# Patient Record
Sex: FEMALE | Race: WHITE | NOT HISPANIC OR LATINO
[De-identification: names, ages, dates, MRNs, and addresses within clinical notes are randomized per-mention and may not be internally consistent; named-entity substitution may affect disease eponyms.]

---

## 2021-06-09 PROBLEM — Z00.00 ENCOUNTER FOR PREVENTIVE HEALTH EXAMINATION: Status: ACTIVE | Noted: 2021-06-09

## 2021-06-10 ENCOUNTER — APPOINTMENT (OUTPATIENT)
Dept: OTOLARYNGOLOGY | Facility: CLINIC | Age: 71
End: 2021-06-10
Payer: MEDICARE

## 2021-06-10 VITALS
TEMPERATURE: 97.5 F | WEIGHT: 152 LBS | HEIGHT: 64 IN | HEART RATE: 51 BPM | SYSTOLIC BLOOD PRESSURE: 119 MMHG | DIASTOLIC BLOOD PRESSURE: 71 MMHG | BODY MASS INDEX: 25.95 KG/M2

## 2021-06-10 DIAGNOSIS — H92.01 OTALGIA, RIGHT EAR: ICD-10-CM

## 2021-06-10 DIAGNOSIS — Z78.9 OTHER SPECIFIED HEALTH STATUS: ICD-10-CM

## 2021-06-10 DIAGNOSIS — H91.93 UNSPECIFIED HEARING LOSS, BILATERAL: ICD-10-CM

## 2021-06-10 PROCEDURE — 92550 TYMPANOMETRY & REFLEX THRESH: CPT

## 2021-06-10 PROCEDURE — 92557 COMPREHENSIVE HEARING TEST: CPT

## 2021-06-10 PROCEDURE — 99204 OFFICE O/P NEW MOD 45 MIN: CPT

## 2021-06-10 RX ORDER — PANCRELIPASE 36000; 180000; 114000 [USP'U]/1; [USP'U]/1; [USP'U]/1
36000-114000 CAPSULE, DELAYED RELEASE PELLETS ORAL
Refills: 0 | Status: ACTIVE | COMMUNITY

## 2021-06-10 RX ORDER — LISINOPRIL AND HYDROCHLOROTHIAZIDE TABLETS 10; 12.5 MG/1; MG/1
10-12.5 TABLET ORAL
Refills: 0 | Status: ACTIVE | COMMUNITY

## 2021-06-10 RX ORDER — CHROMIUM 200 MCG
TABLET ORAL
Refills: 0 | Status: ACTIVE | COMMUNITY

## 2021-06-10 RX ORDER — FLUTICASONE PROPIONATE 50 UG/1
50 SPRAY, METERED NASAL
Refills: 0 | Status: ACTIVE | COMMUNITY

## 2021-06-10 RX ORDER — IBUPROFEN 800 MG/1
TABLET, FILM COATED ORAL
Refills: 0 | Status: ACTIVE | COMMUNITY

## 2021-06-10 NOTE — CONSULT LETTER
[Dear  ___] : Dear  [unfilled], [Courtesy Letter:] : I had the pleasure of seeing your patient, [unfilled], in my office today. [Consult Closing:] : Thank you very much for allowing me to participate in the care of this patient.  If you have any questions, please do not hesitate to contact me. [Sincerely,] : Sincerely, [FreeTextEntry3] : Derrick River MD\par Department of Otolaryngology - Head and Neck Surgery\par [facility]
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
## 2021-06-10 NOTE — PHYSICAL EXAM
[FreeTextEntry1] : Au: EAC clear, no mass or lesion. TM intact and mobile, ME clear [Midline] : trachea located in midline position [Normal] : no rashes

## 2023-03-28 ENCOUNTER — APPOINTMENT (OUTPATIENT)
Dept: UROLOGY | Facility: CLINIC | Age: 73
End: 2023-03-28
Payer: MEDICARE

## 2023-03-28 VITALS
BODY MASS INDEX: 29.06 KG/M2 | RESPIRATION RATE: 16 BRPM | HEART RATE: 59 BPM | WEIGHT: 148 LBS | OXYGEN SATURATION: 97 % | SYSTOLIC BLOOD PRESSURE: 131 MMHG | HEIGHT: 59.84 IN | TEMPERATURE: 97.7 F | DIASTOLIC BLOOD PRESSURE: 74 MMHG

## 2023-03-28 DIAGNOSIS — R31.29 OTHER MICROSCOPIC HEMATURIA: ICD-10-CM

## 2023-03-28 PROCEDURE — 99203 OFFICE O/P NEW LOW 30 MIN: CPT

## 2023-03-28 NOTE — HISTORY OF PRESENT ILLNESS
[FreeTextEntry1] : Patient Name: Alejandra Dickerson\par Date of Birth: [birth year]\par Contact Number: [phone] \par ------------------------------------------------------------------------------\par Date of Initial Visit: 3/28/23\par Referring Provider/PCP: Dr. Bertha Zapata (f. [phone])\par ------------------------------------------------------------------------------\par \par CC: microhematuria\par \par HPI: 72 year old with lupus presents for evaluation of microhematuria. Noted to have 3-10 RBC/HPF on UA 3/2023. Urine culture no growth. Patient has never seen blood in urine. Denies any urinary symptoms - no burning, frequency, urgency. No history of nephrolithiasis.\par \par Risk Factors -  \par Cigarette smoking: smoked ~20 years (1/2 ppd- 1ppd), quit about 30 years ago\par History of gross hematuria: no\par LUTS: no\par History of kidney stones: no\par Recent UTI: no\par Recent trauma or strenuous activity: no\par History of occupational exposures (chemical benzene or aromatic amines): no\par Personal history of cancer: no\par History of cyclophosphamide/ifosfamide chemotherapy: no\par History of pelvic radiation: no\par History of chronic indwelling catheters: no\par Family history of urothelial or other genitourinary cancers: no  malignancies, sister had gyn cancer\par \par  \par PMH is notable for lupus. Has history of neutropenia, being evaluated by oncology.. 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

## 2023-03-28 NOTE — ASSESSMENT
Patient reported that sore throat started few weeks ago, tolerable pain,no dysphagia. 2 days ago, patient reported pain on throat when breathing, denies cough and colds   [FreeTextEntry1] : 72 year old with microhematuria and smoking history. Denies any urinary symptoms. We discussed possible etiologies of hematuria including benign (nephrolithiasis, UTI, medical renal disease) and malignant (urothelial cell carcinoma or kidneys, bladder, ureter, renal mass). We discussed risk factors and risk stratification of hematuria. Patient is high risk given age. Also smoking history but <30 pack years. Risks and benefits of work-up discussed. We will proceed with work-up with CT urogram and cystoscopy.\par \par - CT urogram\par - f/u after CT for cystoscopy\par \par \par \par

## 2023-04-06 ENCOUNTER — NON-APPOINTMENT (OUTPATIENT)
Age: 73
End: 2023-04-06

## 2023-04-18 ENCOUNTER — APPOINTMENT (OUTPATIENT)
Dept: UROLOGY | Facility: CLINIC | Age: 73
End: 2023-04-18
Payer: MEDICARE

## 2023-04-18 VITALS
HEIGHT: 59 IN | OXYGEN SATURATION: 98 % | HEART RATE: 60 BPM | SYSTOLIC BLOOD PRESSURE: 97 MMHG | BODY MASS INDEX: 29.84 KG/M2 | WEIGHT: 148 LBS | DIASTOLIC BLOOD PRESSURE: 56 MMHG | TEMPERATURE: 97.3 F | RESPIRATION RATE: 16 BRPM

## 2023-04-18 PROCEDURE — 52000 CYSTOURETHROSCOPY: CPT

## 2023-04-18 NOTE — HISTORY OF PRESENT ILLNESS
[FreeTextEntry1] : Patient Name: Alejandra Dickerson\par Date of Birth: [birth year]\par Contact Number: [phone] \par ------------------------------------------------------------------------------\par Date of Initial Visit: 3/28/23\par Referring Provider/PCP: Dr. Bertha Zapata (f. [phone])\par ------------------------------------------------------------------------------\par Initial HPI 3/28/23:\par \par CC: microhematuria\par \par HPI: 72 year old with lupus presents for evaluation of microhematuria. Noted to have 3-10 RBC/HPF on UA 3/2023. Urine culture no growth. Patient has never seen blood in urine. Denies any urinary symptoms - no burning, frequency, urgency. No history of nephrolithiasis.\par \par Risk Factors - \par Cigarette smoking: smoked ~20 years (1/2 ppd- 1ppd), quit about 30 years ago\par History of gross hematuria: no\par LUTS: no\par History of kidney stones: no\par Recent UTI: no\par Recent trauma or strenuous activity: no\par History of occupational exposures (chemical benzene or aromatic amines): no\par Personal history of cancer: no\par History of cyclophosphamide/ifosfamide chemotherapy: no\par History of pelvic radiation: no\par History of chronic indwelling catheters: no\par Family history of urothelial or other genitourinary cancers: no  malignancies, sister had gyn cancer\par \par  \par PMH is notable for lupus. Has history of neutropenia, being evaluated by oncology.. \par \par Labs:\par 3/13/23:\par UA: nitrite neg, LE neg, 3 RBC/HPF\par U Cx: <10K olivia\par \par 3/3/23:\par UA: nitrite neg, LE 1+, <5 WBC, 3-10 RBC\par ------------------------------------------------------------------------------\par Interval History 4/18/23:\par \par CT 3/30/23 [facility]: No evidence of hydronephrosis or renal calculi. Peripelvic cysts left kidney measuring up to 1.5 cm. There is a 0.8 cm cortical cyst left kidney. Follow-up of renal cysts is not required. There are no discrete filling defects within the bilateral renal collecting system. URETERS: No evidence of hydroureter. There are no ureteral calculi. URINARY BLADDER: No significant wall thickening of the urinary bladder. There are no bladder calculi. No discrete bladder wall lesion.Diverticulosis of the colon without diverticulitis.Ovoid 1.8 cm hypodensity of the left hepatic lobe questionably focal steatosis. \par \par Cysto today showed mild trigonitis, no suspicious findings.\par \par \par Imaging:\par \par 3/30/23: CTU [facility]\par LIVER: The liver appears normal in size and contour. Elongated right hepatic lobe. 0.4 cm hypodense lesion of the left hepatic lobe is too small to characterize. The ovoid hypodensity of the left hepatic lobe measuring up to 1.8 cm possibly secondary to focal steatosis.\par \par BILIARY: Prior cholecystectomy. Bile duct diameter appears normal.\par \par PANCREAS: The pancreatic duct diameter is normal. No discrete pancreatic lesion.\par \par SPLEEN: The spleen is normal size.\par \par ADRENAL GLANDS: The adrenal glands appear unremarkable.\par \par KIDNEYS: The kidneys appear normal in size. No evidence of hydronephrosis. There are no renal calculi. Peripelvic cysts left kidney measuring up to 1.5 cm. There is a 0.8 cm cortical cyst left kidney. Follow-up of renal cysts is not required. There are no discrete filling defects within the bilateral renal collecting system.\par \par URETERS: No evidence of hydroureter. There are no ureteral calculi.\par \par URINARY BLADDER: No significant wall thickening of the urinary bladder. There are no bladder calculi. No discrete bladder wall lesion.\par \par REPRODUCTIVE ORGANS: Anteverted uterus. Prominent periuterine vessels left greater than right.\par \par STOMACH: The stomach is underdistended.\par \par SMALL BOWEL: No evidence of small bowel obstruction.\par \par LARGE BOWEL: Severe diverticulosis of the sigmoid colon. No evidence of diverticulitis. The appendix appears unremarkable.\par \par PERITONEUM: No evidence of ascites.\par \par LYMPH NODES: Subcentimeter abdominal and pelvic lymph nodes.\par \par VASCULATURE: The diameter of the abdominal aorta is normal. Calcified plaque of the abdominal aorta and iliac branch vessels. Patent portal vein.\par \par SOFT TISSUES: Tiny fat-containing umbilical hernia.\par \par BONES: Degenerative change.\par \par IMPRESSION:  \par No evidence of hydronephrosis or renal calculi. The etiology of the patient's hematuria is not determined by this exam.\par \par Diverticulosis of the colon without diverticulitis.\par \par Ovoid 1.8 cm hypodensity of the left hepatic lobe questionably focal steatosis. Follow-up MRI of the liver with IV contrast could further characterize. Otherwise consider follow-up CT with IV contrast in 3 months. 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

## 2023-04-18 NOTE — ASSESSMENT
[FreeTextEntry1] : 72 year old with microhematuria and smoking history. Denies any urinary symptoms. We discussed possible etiologies of hematuria including benign (nephrolithiasis, UTI, medical renal disease) and malignant (urothelial cell carcinoma or kidneys, bladder, ureter, renal mass). We discussed risk factors and risk stratification of hematuria. Patient is high risk given age. Also smoking history but <30 pack years. Risks and benefits of work-up discussed. CTU showed simple cysts, no evidence of hydronephrosis, stones, concerning lesions. Did show ovoid 1.8 cm hypodensity of the left hepatic lobe questionably focal steatosis - discussed with patient and report sent to PCP - patient understands follow-up imaging indicated. Cysto today: very mild trigonitis, no suspicious findings.\par \par - f/u in one year with repeat UA\par - patient told to return sooner if gross hematuria or increase in number of RBC on subsequent UA\par - patient to f.u with PCP for incidental findings on CT - report given to patient which she will bring to PCP and report faxed to PCP\par \par \par